# Patient Record
Sex: MALE | Race: WHITE | URBAN - METROPOLITAN AREA
[De-identification: names, ages, dates, MRNs, and addresses within clinical notes are randomized per-mention and may not be internally consistent; named-entity substitution may affect disease eponyms.]

---

## 2017-10-10 ENCOUNTER — OFFICE VISIT (OUTPATIENT)
Dept: UROLOGY | Age: 47
End: 2017-10-10

## 2017-10-10 ENCOUNTER — HOSPITAL ENCOUNTER (OUTPATIENT)
Dept: LAB | Age: 47
Discharge: HOME | End: 2017-10-10
Payer: OTHER GOVERNMENT

## 2017-10-10 VITALS
BODY MASS INDEX: 31.02 KG/M2 | OXYGEN SATURATION: 96 % | SYSTOLIC BLOOD PRESSURE: 131 MMHG | HEART RATE: 66 BPM | HEIGHT: 72 IN | DIASTOLIC BLOOD PRESSURE: 92 MMHG | WEIGHT: 229 LBS | TEMPERATURE: 96.8 F

## 2017-10-10 DIAGNOSIS — Z79.890 ENCOUNTER FOR MONITORING TESTOSTERONE REPLACEMENT THERAPY: ICD-10-CM

## 2017-10-10 DIAGNOSIS — Z51.81 ENCOUNTER FOR MONITORING TESTOSTERONE REPLACEMENT THERAPY: ICD-10-CM

## 2017-10-10 DIAGNOSIS — E29.1 HYPOGONADISM IN MALE: ICD-10-CM

## 2017-10-10 DIAGNOSIS — R35.0 FREQUENT URINATION: ICD-10-CM

## 2017-10-10 DIAGNOSIS — E29.1 HYPOGONADISM IN MALE: Primary | ICD-10-CM

## 2017-10-10 DIAGNOSIS — R35.1 NOCTURIA: ICD-10-CM

## 2017-10-10 LAB
BILIRUB UR QL STRIP: NEGATIVE
ERYTHROCYTE [DISTWIDTH] IN BLOOD BY AUTOMATED COUNT: 12 % (ref 11.6–14.5)
GLUCOSE UR-MCNC: NEGATIVE MG/DL
HCT VFR BLD AUTO: 44 % (ref 36–48)
HGB BLD-MCNC: 15.2 G/DL (ref 13–16)
KETONES P FAST UR STRIP-MCNC: NEGATIVE MG/DL
MCH RBC QN AUTO: 29.3 PG (ref 24–34)
MCHC RBC AUTO-ENTMCNC: 34.5 G/DL (ref 31–37)
MCV RBC AUTO: 84.9 FL (ref 74–97)
PH UR STRIP: 5.5 [PH] (ref 4.6–8)
PLATELET # BLD AUTO: 314 K/UL (ref 135–420)
PMV BLD AUTO: 10 FL (ref 9.2–11.8)
PROT UR QL STRIP: NEGATIVE MG/DL
PSA SERPL-MCNC: 1 NG/ML (ref 0–4)
RBC # BLD AUTO: 5.18 M/UL (ref 4.7–5.5)
SP GR UR STRIP: 1.02 (ref 1–1.03)
UA UROBILINOGEN AMB POC: NORMAL (ref 0.2–1)
URINALYSIS CLARITY POC: CLEAR
URINALYSIS COLOR POC: YELLOW
URINE BLOOD POC: NEGATIVE
URINE LEUKOCYTES POC: NEGATIVE
URINE NITRITES POC: NEGATIVE
WBC # BLD AUTO: 4.4 K/UL (ref 4.6–13.2)

## 2017-10-10 PROCEDURE — 84403 ASSAY OF TOTAL TESTOSTERONE: CPT | Performed by: UROLOGY

## 2017-10-10 PROCEDURE — 85027 COMPLETE CBC AUTOMATED: CPT | Performed by: UROLOGY

## 2017-10-10 PROCEDURE — 84153 ASSAY OF PSA TOTAL: CPT | Performed by: UROLOGY

## 2017-10-10 RX ORDER — CLONAZEPAM 0.5 MG/1
TABLET ORAL
Refills: 0 | COMMUNITY
Start: 2017-08-02

## 2017-10-10 RX ORDER — VORTIOXETINE 10 MG/1
TABLET, FILM COATED ORAL
Refills: 0 | COMMUNITY
Start: 2017-08-04

## 2017-10-10 RX ORDER — DEXTROAMPHETAMINE SACCHARATE, AMPHETAMINE ASPARTATE, DEXTROAMPHETAMINE SULFATE AND AMPHETAMINE SULFATE 3.75; 3.75; 3.75; 3.75 MG/1; MG/1; MG/1; MG/1
15 TABLET ORAL
COMMUNITY

## 2017-10-10 RX ORDER — TADALAFIL 5 MG/1
5 TABLET ORAL
COMMUNITY

## 2017-10-10 NOTE — MR AVS SNAPSHOT
Visit Information Date & Time Provider Department Dept. Phone Encounter #  
 10/10/2017  9:30 AM Lynsey Dominique [facility] [phone] Your Appointments 10/31/2017  9:15 AM  
Office Visit with Lynsey Dominique MD  
[facility] [address] [facility]) Appt Note: 2nd cbc/TRT drawn the week before/1st TRT w/ approval  
 [address] [address]  
[phone] [address] [place] [address] Upcoming Health Maintenance Date Due DTaP/Tdap/Td series (1 - Tdap) 2/20/1993 INFLUENZA AGE 9 TO ADULT 8/1/2017 Allergies as of 10/10/2017  Review Complete On: 10/10/2017 By: Kailyn Whiting LPN Severity Noted Reaction Type Reactions Penicillins  10/10/2017    Other (comments) Current Immunizations  Never Reviewed No immunizations on file. Not reviewed this visit You Were Diagnosed With   
  
 Codes Comments Hypogonadism in male    -  Primary ICD-10-CM: E29.1 ICD-9-CM: 257.2 Nocturia     ICD-10-CM: R35.1 ICD-9-CM: 788.43 Frequent urination     ICD-10-CM: R35.0 ICD-9-CM: 788.41 Encounter for monitoring testosterone replacement therapy     ICD-10-CM: Z51.81, [id number] ICD-9-CM: V58.83, V58.69 Vitals BP Pulse Temp Height(growth percentile) Weight(growth percentile) SpO2  
 (!) 131/92 (BP 1 Location: Left arm, BP Patient Position: Sitting) 66 96.8 °F (36 °C) 6' (1.829 m) 229 lb (103.9 kg) 96% BMI Smoking Status 31.06 kg/m2 Never Smoker Vitals History BMI and BSA Data Body Mass Index Body Surface Area 31.06 kg/m 2 2.3 m 2 Your Updated Medication List  
  
   
This list is accurate as of: 10/10/17 10:39 AM.  Always use your most recent med list.  
  
  
  
  
 ADDERALL 15 mg tablet Generic drug:  dextroamphetamine-amphetamine Take 15 mg by mouth. CIALIS 5 mg tablet Generic drug:  tadalafil Take 5 mg by mouth.  
  
 clonazePAM 0.5 mg tablet Commonly known as:  Neal Hallman TK 1 T PO TID PRF ANXIETY TRINTELLIX 10 mg tablet Generic drug:  vortioxetine We Performed the Following AMB POC URINALYSIS DIP STICK AUTO W/O MICRO [22124 CPT(R)] SC COLLECTION VENOUS BLOOD,VENIPUNCTURE [id number] CPT(R)] To-Do List   
 10/10/2017 Lab:  CBC W/O DIFF   
  
 10/10/2017 Lab:  PSA, DIAGNOSTIC (PROSTATE SPECIFIC AG) 10/10/2017 Lab:  TESTICULAR FUNCTION PANEL Patient Instructions Testosterone (By injection) Testosterone (destiny-TOS-ter-one) Treats low testosterone levels. Also treats breast cancer in women and delayed puberty in male teenagers. Brand Name(s): Aveed, Delatestryl, Depo-Testosterone, Depo-Testosterone Novaplus, PremierPro RX Depo-Testosterone, Testone CIK There may be other brand names for this medicine. When This Medicine Should Not Be Used: This medicine is not right for everyone. You should not receive it if you had an allergic reaction to testosterone, benzyl benzoate, or refined castor oil. A man should not receive this medicine if he has breast cancer or prostate cancer. A woman should not receive this medicine if she is pregnant or breastfeeding. How to Use This Medicine:  
Injectable · Your doctor will prescribe your exact dose and tell you how often it should be given. This medicine is given as a shot into one of your muscles. It is usually given in the buttocks. · A nurse or other health provider will give you this medicine. · This medicine should come with a Medication Guide. Ask your pharmacist for a copy if you do not have one. · Missed dose: Call your doctor or pharmacist for instructions. Drugs and Foods to Avoid: Ask your doctor or pharmacist before using any other medicine, including over-the-counter medicines, vitamins, and herbal products. · Some medicines can affect how testosterone works. Tell your doctor if you are using any of the following:  
¨ Oxyphenbutazone ¨ Blood thinner (including warfarin) ¨ Insulin or diabetes medicine that you take by mouth ¨ Steroid medicine (including dexamethasone, hydrocortisone, methylprednisolone, prednisolone, prednisone) Warnings While Using This Medicine: · It is not safe to take this medicine during pregnancy. It could harm an unborn baby. Tell your doctor right away if you become pregnant. · Tell your doctor if you have kidney disease, liver disease, diabetes, an enlarged prostate, heart disease, high cholesterol, lung disease, sleep apnea, or a history of heart attack or stroke. · This medicine may cause the following problems: 
¨ Serious lung reaction called pulmonary oil embolism (may be life-threatening) ¨ Increased risk of prostate cancer ¨ Increased numbers of red blood cells ¨ Blood clot in your leg or lung ¨ Slow growth in children ¨ Increased risk of heart attack or stroke ¨ Lower sperm count (with large doses) · This medicine can be habit-forming. Do not use more than your prescribed dose. Call your doctor if you think your medicine is not working. · Your doctor will do lab tests at regular visits to check on the effects of this medicine. Keep all appointments. Possible Side Effects While Using This Medicine:  
Call your doctor right away if you notice any of these side effects: · Allergic reaction: Itching or hives, swelling in your face or hands, swelling or tingling in your mouth or throat, chest tightness, trouble breathing · Change in how much or how often you urinate, trouble urinating · Chest pain, cough, trouble breathing, dizziness, tightening of your throat, unusual sweating · Dark urine or pale stools, nausea, vomiting, loss of appetite, stomach pain, yellow skin or eyes · Pain, redness, or swelling in your arm or leg · Swelling in your hands, ankles, or feet · Unusual bleeding, bruising, or weakness If you notice these less serious side effects, talk with your doctor: · Acne, hoarse voice, facial hair growth (women) · Changes in menstrual periods · More erections than usual or erections that last a long time · Pain or redness where the shot was given · Swollen breasts (men) If you notice other side effects that you think are caused by this medicine, tell your doctor. Call your doctor for medical advice about side effects. You may report side effects to FDA at 6-880-YPD-0432 © 2017 [address] [employer] Information is for End User's use only and may not be sold, redistributed or otherwise used for commercial purposes. The above information is an  only. It is not intended as medical advice for individual conditions or treatments. Talk to your doctor, nurse or pharmacist before following any medical regimen to see if it is safe and effective for you. Introducing [facility] & HEALTH SERVICES! [employer] introduces [employer] patient portal. Now you can access parts of your medical record, email your doctor's office, and request medication refills online. 1. In your internet browser, go to [email or web address] 2. Click on the First Time User? Click Here link in the Sign In box. You will see the New Member Sign Up page. 3. Enter your Bkam Access Code exactly as it appears below. You will not need to use this code after youve completed the sign-up process. If you do not sign up before the expiration date, you must request a new code. · Bkam Access Code: [email or web address] Expires: 1/8/2018  9:50 AM 
 
4. Enter the last four digits of your Social Security Number (xxxx) and Date of Birth (mm/dd/yyyy) as indicated and click Submit. You will be taken to the next sign-up page. 5. Create a Retail Convergencet ID. This will be your Bkam login ID and cannot be changed, so think of one that is secure and easy to remember. 6. Create a [employer] password. You can change your password at any time. 7. Enter your Password Reset Question and Answer. This can be used at a later time if you forget your password. 8. Enter your e-mail address. You will receive e-mail notification when new information is available in [address] 9. Click Sign Up. You can now view and download portions of your medical record. 10. Click the Download Summary menu link to download a portable copy of your medical information. If you have questions, please visit the Frequently Asked Questions section of the [employer] website. Remember, [employer] is NOT to be used for urgent needs. For medical emergencies, dial 911. Now available from your iPhone and Android! Please provide this summary of care documentation to your next provider. If you have any questions after today's visit, please call [phone].

## 2017-10-10 NOTE — PROGRESS NOTES
Mr. Wayne Gilliam has a reminder for a \"due or due soon\" health maintenance. I have asked that he contact his primary care provider for follow-up on this health maintenance. RBV per Dr. Lin Macdonald blood drawn in office today for PSA, CBC for Encounter for long use of Testosterone, Nocturia, Frequent urination and Testicular panel for Hypogonadism.

## 2017-10-10 NOTE — PATIENT INSTRUCTIONS
Testosterone (By injection)   Testosterone (destiny-TOS-ter-one)  Treats low testosterone levels. Also treats breast cancer in women and delayed puberty in male teenagers. Brand Name(s): Aveed, Delatestryl, Depo-Testosterone, Depo-Testosterone Novaplus, PremierPro RX Depo-Testosterone, Testone CIK   There may be other brand names for this medicine. When This Medicine Should Not Be Used: This medicine is not right for everyone. You should not receive it if you had an allergic reaction to testosterone, benzyl benzoate, or refined castor oil. A man should not receive this medicine if he has breast cancer or prostate cancer. A woman should not receive this medicine if she is pregnant or breastfeeding. How to Use This Medicine:   Injectable  · Your doctor will prescribe your exact dose and tell you how often it should be given. This medicine is given as a shot into one of your muscles. It is usually given in the buttocks. · A nurse or other health provider will give you this medicine. · This medicine should come with a Medication Guide. Ask your pharmacist for a copy if you do not have one. · Missed dose: Call your doctor or pharmacist for instructions. Drugs and Foods to Avoid:   Ask your doctor or pharmacist before using any other medicine, including over-the-counter medicines, vitamins, and herbal products. · Some medicines can affect how testosterone works. Tell your doctor if you are using any of the following:   ¨ Oxyphenbutazone  ¨ Blood thinner (including warfarin)  ¨ Insulin or diabetes medicine that you take by mouth  ¨ Steroid medicine (including dexamethasone, hydrocortisone, methylprednisolone, prednisolone, prednisone)  Warnings While Using This Medicine:   · It is not safe to take this medicine during pregnancy. It could harm an unborn baby. Tell your doctor right away if you become pregnant.   · Tell your doctor if you have kidney disease, liver disease, diabetes, an enlarged prostate, heart disease, high cholesterol, lung disease, sleep apnea, or a history of heart attack or stroke. · This medicine may cause the following problems:  ¨ Serious lung reaction called pulmonary oil embolism (may be life-threatening)  ¨ Increased risk of prostate cancer  ¨ Increased numbers of red blood cells  ¨ Blood clot in your leg or lung  ¨ Slow growth in children  ¨ Increased risk of heart attack or stroke  ¨ Lower sperm count (with large doses)  · This medicine can be habit-forming. Do not use more than your prescribed dose. Call your doctor if you think your medicine is not working. · Your doctor will do lab tests at regular visits to check on the effects of this medicine. Keep all appointments. Possible Side Effects While Using This Medicine:   Call your doctor right away if you notice any of these side effects:  · Allergic reaction: Itching or hives, swelling in your face or hands, swelling or tingling in your mouth or throat, chest tightness, trouble breathing  · Change in how much or how often you urinate, trouble urinating  · Chest pain, cough, trouble breathing, dizziness, tightening of your throat, unusual sweating  · Dark urine or pale stools, nausea, vomiting, loss of appetite, stomach pain, yellow skin or eyes  · Pain, redness, or swelling in your arm or leg  · Swelling in your hands, ankles, or feet  · Unusual bleeding, bruising, or weakness  If you notice these less serious side effects, talk with your doctor:   · Acne, hoarse voice, facial hair growth (women)  · Changes in menstrual periods  · More erections than usual or erections that last a long time  · Pain or redness where the shot was given  · Swollen breasts (men)  If you notice other side effects that you think are caused by this medicine, tell your doctor. Call your doctor for medical advice about side effects.  You may report side effects to FDA at 7-309-HPJ-2863  © 2017 [facility] Information is for End User's use only and may not be sold, redistributed or otherwise used for commercial purposes. The above information is an  only. It is not intended as medical advice for individual conditions or treatments. Talk to your doctor, nurse or pharmacist before following any medical regimen to see if it is safe and effective for you.

## 2017-10-11 LAB
FSH SERPL-ACNC: 4.7 MIU/ML (ref 1.5–12.4)
LH SERPL-ACNC: 4.8 MIU/ML (ref 1.7–8.6)
PROLACTIN SERPL-MCNC: 7.1 NG/ML (ref 4–15.2)
TESTOST SERPL-MCNC: 168 NG/DL (ref 264–916)

## 2017-10-11 NOTE — PROGRESS NOTES
Reyna Sweeney 41 y.o. male     Mr. Sweeney seen today for evaluation and treatment of hypogonadism treated with testosterone replacement therapy during the past 5 years apparently using testosterone patches or testosterone gel applied to the skin-intramuscular testosterone treatment combined with low-dose Cialis as improved erectile function    Testosterone in the 180 range in 2013    Pt  has no irritative or obstructive voiding symptoms at this time    Review of Systems:   CNS: No seizure syncope headaches dizziness or visual changes  Respiratory: No wheezing no shortness of breath no chest pain  Cardiovascular: No palpitations no angina no hypertension  Intestinal: No dyspepsia diarrhea or constipation  Urinary: No urgency frequency dysuria or hematuria  Skeletal no bone or joint pain:   Endocrine no diabetes or thyroid disease: Other:                                                                                                                     [employer]/20 years active service    Allergies: Allergies   Allergen Reactions    Penicillins Other (comments)      Medications:    Current Outpatient Prescriptions   Medication Sig Dispense Refill    tadalafil (CIALIS) 5 mg tablet Take 5 mg by mouth.  dextroamphetamine-amphetamine (ADDERALL) 15 mg tablet Take 15 mg by mouth.  TRINTELLIX 10 mg tablet   0    clonazePAM (KLONOPIN) 0.5 mg tablet TK 1 T PO TID PRF ANXIETY  0       History reviewed. No pertinent past medical history. History reviewed. No pertinent surgical history.   Family History   Problem Relation Age of Onset    Diabetes Father     Stroke Paternal Uncle         Physical Examination: Nourished mature male in no apparent distress    Abdomen is nontender no palpable masses no organomegaly  Back-no percussion CVA tenderness on either side  No inguinal hernia or adenopathy  Penis is normal  Testes are normal in size shape and consistency bilaterally-no epididymal induration or tenderness on either side  Spermatic cords are palpably normal bilaterally  Scrotum is normal  Prostate by FALLON is rounded, smooth, benign in consistency and nontender-no induration no nodularity  No rectal masses tenderness or induration      Urinalysis: Negative dipstick/nitrite negative/heme-negative    Impression: Hypogonadism but it favorably to testosterone replacement therapy by intramuscular injection of testosterone        Plan: CBC, PSA, testosterone panel today    rtc 2 weeks-testosterone level    rtc 3 weeks      Discussed prospect of self directed TRT/health plan approval for TRT        Renny Alcantar MD  -electronically signed-    PLEASE NOTE:  This document has been produced using voice recognition software. Unrecognized errors in transcription may be present.

## 2017-10-24 ENCOUNTER — HOSPITAL ENCOUNTER (OUTPATIENT)
Dept: LAB | Age: 47
Discharge: HOME | End: 2017-10-24
Payer: OTHER GOVERNMENT

## 2017-10-24 ENCOUNTER — LAB ONLY (OUTPATIENT)
Dept: UROLOGY | Age: 47
End: 2017-10-24

## 2017-10-24 DIAGNOSIS — E29.1 HYPOGONADISM IN MALE: ICD-10-CM

## 2017-10-24 DIAGNOSIS — E29.1 HYPOGONADISM IN MALE: Primary | ICD-10-CM

## 2017-10-24 PROCEDURE — 84403 ASSAY OF TOTAL TESTOSTERONE: CPT | Performed by: UROLOGY

## 2017-10-24 NOTE — MR AVS SNAPSHOT
Visit Information Date & Time Provider Department Dept. Phone Encounter #  
 10/24/2017 10:30 AM Samantha Molina, [facility] [phone] Your Appointments 10/31/2017  9:15 AM  
Office Visit with Samantha Molina MD  
[facility]) Appt Note: 2nd cbc/TRT drawn the week before/1st TRT w/ approval  
 [address] [facility] [address] [address]  
[phone] [address] [facility] [address] Upcoming Health Maintenance Date Due DTaP/Tdap/Td series (1 - Tdap) 2/20/1993 INFLUENZA AGE 9 TO ADULT 8/1/2017 Allergies as of 10/24/2017  Review Complete On: 10/10/2017 By: Samantha Molina MD  
  
 Severity Noted Reaction Type Reactions Penicillins  10/10/2017    Other (comments) Current Immunizations  Never Reviewed No immunizations on file. Not reviewed this visit You Were Diagnosed With   
  
 Codes Comments Hypogonadism in male    -  Primary ICD-10-CM: E29.1 ICD-9-CM: 257.2 Vitals Smoking Status Never Smoker Your Updated Medication List  
  
   
This list is accurate as of: 10/24/17 10:47 AM.  Always use your most recent med list.  
  
  
  
  
 ADDERALL 15 mg tablet Generic drug:  dextroamphetamine-amphetamine Take 15 mg by mouth. CIALIS 5 mg tablet Generic drug:  tadalafil Take 5 mg by mouth.  
  
 clonazePAM 0.5 mg tablet Commonly known as:  Neeta Laird TK 1 T PO TID PRF ANXIETY TRINTELLIX 10 mg tablet Generic drug:  vortioxetine We Performed the Following [place] COLLECTION VENOUS BLOOD,VENIPUNCTURE [id number] CPT(R)] To-Do List   
 10/24/2017 Lab:  TESTOSTERONE, TOTAL, ADULT MALE Introducing [facility] HE[facility] SERVICES!    
 [employer] introduces [employer] patient portal. Now you can access parts of your medical record, email your doctor's office, and request medication refills online. 1. In your internet browser, go to [email or web address] 2. Click on the First Time User? Click Here link in the Sign In box. You will see the New Member Sign Up page. 3. Enter your D[employer] Access Code exactly as it appears below. You will not need to use this code after youve completed the sign-up process. If you do not sign up before the expiration date, you must request a new code. · D[employer] Access Code: [email or web address] Expires: 1/8/2018  9:50 AM 
 
4. Enter the last four digits of your Social Security Number (xxxx) and Date of Birth (mm/dd/yyyy) as indicated and click Submit. You will be taken to the next sign-up page. 5. Create a D[employer]wood ID. This will be your D[employer]wood login ID and cannot be changed, so think of one that is secure and easy to remember. 6. Create a [employer] password. You can change your password at any time. 7. Enter your Password Reset Question and Answer. This can be used at a later time if you forget your password. 8. Enter your e-mail address. You will receive e-mail notification when new information is available in [address] 9. Click Sign Up. You can now view and download portions of your medical record. 10. Click the Download Summary menu link to download a portable copy of your medical information. If you have questions, please visit the Frequently Asked Questions section of the D[employer] website. Remember, [employer] is NOT to be used for urgent needs. For medical emergencies, dial 911. Now available from your iPhone and Android! Please provide this summary of care documentation to your next provider. If you have any questions after today's visit, please call [phone].

## 2017-10-25 LAB — TESTOST SERPL-MCNC: 176 NG/DL (ref 264–916)

## 2017-10-31 ENCOUNTER — OFFICE VISIT (OUTPATIENT)
Dept: UROLOGY | Age: 47
End: 2017-10-31

## 2017-10-31 VITALS
HEART RATE: 57 BPM | BODY MASS INDEX: 31.02 KG/M2 | OXYGEN SATURATION: 98 % | TEMPERATURE: 97.8 F | SYSTOLIC BLOOD PRESSURE: 134 MMHG | HEIGHT: 72 IN | WEIGHT: 229 LBS | DIASTOLIC BLOOD PRESSURE: 89 MMHG

## 2017-10-31 DIAGNOSIS — E29.1 HYPOGONADISM IN MALE: Primary | ICD-10-CM

## 2017-10-31 RX ORDER — TESTOSTERONE CYPIONATE 200 MG/ML
200 INJECTION INTRAMUSCULAR ONCE
Qty: 1 ML | Refills: 0
Start: 2017-10-31 | End: 2017-10-31

## 2017-10-31 NOTE — MR AVS SNAPSHOT
Visit Information Date & Time Provider Department Dept. Phone Encounter #  
 10/31/2017  9:15 AM [facility] [phone] Follow-up Instructions Return in about 6 months (around 4/30/2018) for Is a CBC. Follow-up and Disposition History Your Appointments 5/1/2018  9:15 AM  
Office Visit with Shanon Koo MD  
[facility]) Appt Note: cbc/psa/self trt/ make sure patient keeps appt [address] [place] [address] [address]  
[phone] [address] [facility] [address] Upcoming Health Maintenance Date Due DTaP/Tdap/Td series (1 - Tdap) 2/20/1993 INFLUENZA AGE 9 TO ADULT 8/1/2017 Allergies as of 10/31/2017  Review Complete On: 10/31/2017 By: Shanon Koo MD  
  
 Severity Noted Reaction Type Reactions Penicillins  10/10/2017    Other (comments) Current Immunizations  Never Reviewed No immunizations on file. Not reviewed this visit You Were Diagnosed With   
  
 Codes Comments Hypogonadism in male    -  Primary ICD-10-CM: E29.1 ICD-9-CM: 257.2 Vitals BP Pulse Temp Height(growth percentile) Weight(growth percentile) SpO2  
 134/89 (BP 1 Location: Left arm, BP Patient Position: Sitting) (!) 57 97.8 °F (36.6 °C) 6' (1.829 m) 229 lb (103.9 kg) 98% BMI Smoking Status 31.06 kg/m2 Never Smoker BMI and BSA Data Body Mass Index Body Surface Area 31.06 kg/m 2 2.3 m 2 Preferred Pharmacy Pharmacy Name Phone [id number] [place], [address] [facility] [facility] [phone] Your Updated Medication List  
  
   
This list is accurate as of: 10/31/17 11:31 AM.  Always use your most recent med list.  
  
  
  
  
 ADDERALL 15 mg tablet Generic drug:  dextroamphetamine-amphetamine Take 15 mg by mouth. CIALIS 5 mg tablet Generic drug:  tadalafil Take 5 mg by mouth.  
  
 clonazePAM 0.5 mg tablet Commonly known as:  Neetapadmini Laird TK 1 T PO TID PRF ANXIETY TRINTELLIX 10 mg tablet Generic drug:  vortioxetine Follow-up Instructions Return in about 6 months (around 4/30/2018) for Is a CBC. Patient Instructions Hypogonadism: Care Instructions Your Care Instructions Men who have hypogonadism do not make enough testosterone. This hormone allows men to make sperm and to have normal physical male traits. The condition also is known as testosterone deficiency. It can lead to loss of sex drive, weakness, impotence, infertility, and weakened bones. Many things can cause this condition. Causes include injured testicles, certain medicines, an infection, and aging. Having a long-term health problem such as kidney or liver disease or being obese can cause it. So can surgery or radiation treatment for another health problem. It also can be present at birth. It is most often treated with testosterone hormone. You can get the hormone as a shot or through a patch or gel on the skin. Follow-up care is a key part of your treatment and safety. Be sure to make and go to all appointments, and call your doctor if you are having problems. It's also a good idea to know your test results and keep a list of the medicines you take. How can you care for yourself at home? · Take your medicines exactly as prescribed. Call your doctor if you think you are having a problem with your medicine. You will get more details on the specific medicines your doctor prescribes. · Follow your treatment plan. If you use testosterone hormones, follow your doctor's instructions. Hormones can help relieve many of the effects of this condition, such as impotence. But it may take weeks or months for your symptoms to improve. · Get plenty of exercise.  And make sure to get plenty of calcium and vitamin D in your diet. Eat more dairy foods and green vegetables. They can help keep your bones from getting weak. · If you have a hard time dealing with this condition, talk to your doctor about joining a support group. Talking with others who have the same problems can help you cope. When should you call for help? Call your doctor now or seek immediate medical care if: 
? · You have headaches. ? · You have problems with your vision. ? Watch closely for changes in your health, and be sure to contact your doctor if: 
? · You have trouble getting or keeping an erection. ? · You have a loss of body hair. ? · You feel weak or tired a lot of the time. ? · Your breasts are getting larger. ? · You do not get better as expected. Where can you learn more? Go to http://kale-gerard.info/. Enter 99 [id number] in the search box to learn more about \"Hypogonadism: Care Instructions. \" Current as of: May 12, 2017 Content Version: 11.4 © 6788-4416 [employer]. Care instructions adapted under license by [employer] (which disclaims liability or warranty for this information). If you have questions about a medical condition or this instruction, always ask your healthcare professional. [place] [address] any warranty or liability for your use of this information. Patient Instructions History Introducing [facility]! Brigitte Hays introduces Verdezyne patient portal. Now you can access parts of your medical record, email your doctor's office, and request medication refills online. 1. In your internet browser, go to [email or web address]. [employer] 2. Click on the First Time User? Click Here link in the Sign In box. You will see the New Member Sign Up page. 3. Enter your Verdezyne Access Code exactly as it appears below. You will not need to use this code after youve completed the sign-up process.  If you do not sign up before the expiration date, you must request a new code. · SlickLogin Access Code: [email or web address] Expires: 1/8/2018  9:50 AM 
 
4. Enter the last four digits of your Social Security Number (xxxx) and Date of Birth (mm/dd/yyyy) as indicated and click Submit. You will be taken to the next sign-up page. 5. Create a SlickLogin ID. This will be your SlickLogin login ID and cannot be changed, so think of one that is secure and easy to remember. 6. Create a SlickLogin password. You can change your password at any time. 7. Enter your Password Reset Question and Answer. This can be used at a later time if you forget your password. 8. Enter your e-mail address. You will receive e-mail notification when new information is available in [address]. 9. Click Sign Up. You can now view and download portions of your medical record. 10. Click the Download Summary menu link to download a portable copy of your medical information. If you have questions, please visit the Frequently Asked Questions section of the SlickLogin website. Remember, SlickLogin is NOT to be used for urgent needs. For medical emergencies, dial 911. Now available from your iPhone and Android! Please provide this summary of care documentation to your next provider. If you have any questions after today's visit, please call [phone].

## 2017-10-31 NOTE — PROGRESS NOTES
Mr. Sheree Villanueva has a reminder for a \"due or due soon\" health maintenance. I have asked that he contact his primary care provider for follow-up on this health maintenance.

## 2017-10-31 NOTE — PATIENT INSTRUCTIONS
Hypogonadism: Care Instructions  Your Care Instructions    Men who have hypogonadism do not make enough testosterone. This hormone allows men to make sperm and to have normal physical male traits. The condition also is known as testosterone deficiency. It can lead to loss of sex drive, weakness, impotence, infertility, and weakened bones. Many things can cause this condition. Causes include injured testicles, certain medicines, an infection, and aging. Having a long-term health problem such as kidney or liver disease or being obese can cause it. So can surgery or radiation treatment for another health problem. It also can be present at birth. It is most often treated with testosterone hormone. You can get the hormone as a shot or through a patch or gel on the skin. Follow-up care is a key part of your treatment and safety. Be sure to make and go to all appointments, and call your doctor if you are having problems. It's also a good idea to know your test results and keep a list of the medicines you take. How can you care for yourself at home? · Take your medicines exactly as prescribed. Call your doctor if you think you are having a problem with your medicine. You will get more details on the specific medicines your doctor prescribes. · Follow your treatment plan. If you use testosterone hormones, follow your doctor's instructions. Hormones can help relieve many of the effects of this condition, such as impotence. But it may take weeks or months for your symptoms to improve. · Get plenty of exercise. And make sure to get plenty of calcium and vitamin D in your diet. Eat more dairy foods and green vegetables. They can help keep your bones from getting weak. · If you have a hard time dealing with this condition, talk to your doctor about joining a support group. Talking with others who have the same problems can help you cope. When should you call for help?   Call your doctor now or seek immediate medical care if:  ? · You have headaches. ? · You have problems with your vision. ? Watch closely for changes in your health, and be sure to contact your doctor if:  ? · You have trouble getting or keeping an erection. ? · You have a loss of body hair. ? · You feel weak or tired a lot of the time. ? · Your breasts are getting larger. ? · You do not get better as expected. Where can you learn more? Go to http://kale-gerard.info/. Enter 99 [id number] in the search box to learn more about \"Hypogonadism: Care Instructions. \"  Current as of: May 12, 2017  Content Version: 11.4  © 8740-6766 Healthwise, [employer]. Care instructions adapted under license by [employer] (which disclaims liability or warranty for this information). If you have questions about a medical condition or this instruction, always ask your healthcare professional. [address] any warranty or liability for your use of this information.

## 2017-10-31 NOTE — PROGRESS NOTES
Fab Jody Patel 41 y.o. male     Mr. Sweeney seen today for hypogonadism follow-up  hypogonadism treated with testosterone replacement therapy during the past 5 years apparently using testosterone patches or testosterone gel applied to the skin-intramuscular testosterone treatment combined with low-dose Cialis as improved erectile function     Testosterone in the 180 range in 2013     Pt  has no irritative or obstructive voiding symptoms at this time    Testosterone 176 on 25 October 2017  Testosterone 168 on 10 October 2017:  PSA 1.0  FSH 4.7  LH 4.8  Prolactin 7.1  Hematocrit 44.0     Review of Systems:   CNS: No seizure syncope headaches dizziness or visual changes  Respiratory: No wheezing no shortness of breath no chest pain  Cardiovascular: No palpitations no angina no hypertension  Intestinal: No dyspepsia diarrhea or constipation  Urinary: No urgency frequency dysuria or hematuria  Skeletal no bone or joint pain:   Endocrine no diabetes or thyroid disease: Other:                                                                                                                     [employer]/20 years active service       Allergies: Allergies   Allergen Reactions    Penicillins Other (comments)      Medications:    Current Outpatient Prescriptions   Medication Sig Dispense Refill    tadalafil (CIALIS) 5 mg tablet Take 5 mg by mouth.  dextroamphetamine-amphetamine (ADDERALL) 15 mg tablet Take 15 mg by mouth.  TRINTELLIX 10 mg tablet   0    clonazePAM (KLONOPIN) 0.5 mg tablet TK 1 T PO TID PRF ANXIETY  0       History reviewed. No pertinent past medical history. History reviewed. No pertinent surgical history.   Family History   Problem Relation Age of Onset    Diabetes Father     Stroke Paternal Uncle         Physical Examination: Well-nourished mature male in no apparent distress    Urinalysis: No specimen today    Impression: Hypogonadism        Plan: Testosterone 200 mg IM right thigh today patient demonstrated proficiency in the technique of-intramuscular injection    rtc 6 mo PSA CBC      More than 1/2 of this 15 minute visit was spent in counselling and coordination of care, as described above. Radha Beltran MD  -electronically signed-    PLEASE NOTE:  This document has been produced using voice recognition software. Unrecognized errors in transcription may be present.